# Patient Record
(demographics unavailable — no encounter records)

---

## 2020-08-19 NOTE — PDOC
History of Present Illness





- General


Chief Complaint: Sore Throat


Stated Complaint: SORE THROAT


Time Seen by Provider: 08/19/20 23:25


History Source: Patient


Exam Limitations: No Limitations





- History of Present Illness


Initial Comments: 





08/19/20 23:39


37-year-old female no significant past medical history presenting to the ED with

sore throat for 3 days.  Patient states that her mother had similar symptoms 

which have now resolved.  Patient is still able to tolerate p.o. fluids and food

without difficulty.  Patient was COVID positive back in March.  Pt otherwise 

denies: fevers, chills, syncope, lightheadedness, dizziness, headaches,  neck 

pain, chest pain, shortness of breath, palpitations, back pain, abdominal pain, 

nausea, vomiting, diarrhea, constipation.





Past History





- Medical History


Allergies/Adverse Reactions: 


                                    Allergies











Allergy/AdvReac Type Severity Reaction Status Date / Time


 


No Known Allergies Allergy   Verified 02/10/20 11:26











Home Medications: 


Ambulatory Orders





Ciprofloxacin HCl [Cipro] 500 mg PO BID 7 Days #14 tablet 02/10/20 


Loperamide HCl [Loperamide] 2 mg PO PRN PRN #14 capsule 03/22/20 








COPD: No


CHF: No


DVT: No


Dementia: No





- Surgical History


GI Surgery: Yes (Hernia Repair)





- Reproductive History


Is Patient Pregnant Now?: No





- Immunization History


Immunization Up to Date: Yes





- Psycho-Social/Smoking History


Smoking History: Never smoked


Have you smoked in the past 12 months: No





- Substance Abuse Hx (Audit-C & DAST Scrn)


How often the patient has a drink containing alcohol: Never


Score: In Men: 4 or > Positive; In Women: 3 or > Positive: 0


Screen Result (Pos requires Nsg. Audit-10AR): Negative


In the last yr the pt used illegal drug/Rx for NonMed reason: No


Score:  Yes response is considered Positive: 0


Screen Result (Positive result requires Nsg. DAST-10): Negative





*Physical Exam





- Vital Signs


                                Last Vital Signs











Temp Pulse Resp BP Pulse Ox


 


 98.3 F   113 H  19   132/86   98 


 


 08/19/20 23:13  08/19/20 23:13  08/19/20 23:13  08/19/20 23:13  08/19/20 23:13














- Physical Exam





08/19/20 23:40


Gen: AAOx 3, no acute distress, comfortable, no signs of respiratory distress 


HENT: atraumatic, normocephalic with no laceration or contusion. Nasal mucosa 

without erythema. Oropharynx without erythema or exudates. Mucous membranes 

moist. 


EYES: PERRL, EOM intact, conjunctiva pink 


NECK: supple; trachea midline; no JVD, no lymphadenopathy, or thyromegaly


CV: RRR no murmurs, gallops, or rubs.


CHEST: CTA b/l no wheezing, rales or rhonchi


ABD: +BS/ND. no TTP; soft, no rebound, no guarding


EXTREMITY: no cyanosis or erythema. 2+ dorsalis pedis, posterior tibial, and 

radial pulse. No pedal edema; no calf swelling or tenderness 


SKIN: no rash, warm and dry, no diaphoresis 


HEME: no purpura or ecchymosis


NEURO: normal speech, CN II-XII intact, sensation intact, normal gait, no 

cerebellar deficits


MS: 5/5 strength in all extremities, FROM intact in all extremities.








Medical Decision Making





- Medical Decision Making





08/19/20 23:40


37-year-old female with sore throat


Vital signs significant for mild tachycardia 113





Benign physical exam





Most likely viral pharyngitis





Tachycardia has now resolved in the ED patient's heart rate in the mid 90s





Pt appears well and is safe and stable for discharge with strict return 

precautions including signs and symptoms requring immediate return to the ED





Supportive care instructions explained and given to pt.  Reasons to return 

emergently to ER explained and given. Importance of follow up with PMD and other

specialists as indicated stressed to pt. Pt verbalized understanding of 

instructions.  Pt to follow up with PMD in 2 days.





Discharge





- Discharge Information


Problems reviewed: Yes


Clinical Impression/Diagnosis: 


 Sore throat (viral)





Condition: Stable


Disposition: HOME





- Follow up/Referral


Referrals: 


Isak Gabriel [Primary Care Provider] - 





- Patient Discharge Instructions


Patient Printed Discharge Instructions:  [facility] for Viral Pharyngitis


Additional Instructions: 


RETURN TO THE ER IF YOUR SYMPTOMS WORSEN 





- Post Discharge Activity


Work/Back to School Note:  Back to Work

## 2020-08-19 NOTE — PDOC
*Physical Exam





- Vital Signs


                                Last Vital Signs











Temp Pulse Resp BP Pulse Ox


 


 98.3 F   113 H  19   132/86   98 


 


 08/19/20 23:13  08/19/20 23:13  08/19/20 23:13  08/19/20 23:13  08/19/20 23:13














Medical Decision Making





- Medical Decision Making





08/19/20 23:35


Patient seen by the advanced practice provider under my  supervision. Ancillary 

testing reviewed as necessary.


I agree with plan as outlined by the advanced practice provider.





Discharge





- Discharge Information


Problems reviewed: Yes


Clinical Impression/Diagnosis: 


 Sore throat (viral)





Condition: Stable


Disposition: HOME





- Follow up/Referral


Referrals: 


Isak Gabriel [Primary Care Provider] - 





- Patient Discharge Instructions


Patient Printed Discharge Instructions:  [facility] for Viral Pharyngitis


Additional Instructions: 


RETURN TO THE ER IF YOUR SYMPTOMS WORSEN 





- Post Discharge Activity


Work/Back to School Note:  Back to Work

## 2020-10-13 NOTE — PDOC
History of Present Illness





- General


Chief Complaint: Pain


Stated Complaint: LEG PAIN


Time Seen by Provider: 10/13/20 08:55


History Source: Patient


Exam Limitations: No Limitations





- History of Present Illness


Initial Comments: 





10/13/20 09:06


Patient is a 37-year-old female with a history of prediabetes who presents to 

the ED with complaint of right lower extremity pain, primarily her thigh, for 

the last 4 days.  The patient states that the pain is mostly when walking or 

moving.  She denies any injury.  She denies any falls.  She has been taking 

Aleve which has not been helping.  She states a similar pain started 1 month ago

but resolved after taking Aleve.  The patient denies any birth control, recent 

long travel, or cancer history.  She does state a month ago she drove to [place]

but it took under 5 hours to get there.  The patient denies any shortness of 

breath or chest pain.  She denies palpitations.  She states the pain is deep 

inside and not reproducible.  She denies any allergies to medications.





Past History





- Medical History


Allergies/Adverse Reactions: 


                                    Allergies











Allergy/AdvReac Type Severity Reaction Status Date / Time


 


No Known Allergies Allergy   Verified 10/13/20 08:47











Home Medications: 


Ambulatory Orders





Ciprofloxacin HCl [Cipro] 500 mg PO BID 7 Days #14 tablet 02/10/20 


Loperamide HCl [Loperamide] 2 mg PO PRN PRN #14 capsule 03/22/20 


Cyclobenzaprine HCl [Flexeril 10 mg] 10 mg PO BID PRN #20 tablet 10/13/20 








COPD: No


CHF: No


DVT: No


Dementia: No


Diabetes:  (Pre diabetes)





- Surgical History


GI Surgery: Yes (Hernia Repair)





- Reproductive History


Is Patient Pregnant Now?: No





- Immunization History


Immunization Up to Date: Yes





- Psycho-Social/Smoking History


Smoking History: Never smoked


Have you smoked in the past 12 months: No





- Substance Abuse Hx (Audit-C & DAST Scrn)


How often the patient has a drink containing alcohol: Never


Score: In Men: 4 or > Positive; In Women: 3 or > Positive: 0


Screen Result (Pos requires Nsg. Audit-10AR): Negative


In the last yr the pt used illegal drug/Rx for NonMed reason: No


Score:  Yes response is considered Positive: 0


Screen Result (Positive result requires Nsg. DAST-10): Negative





**Review of Systems





- Review of Systems


Comments:: 





10/13/20 09:07


- Review of Systems


Able to Perform ROS?: Yes


Constitutional: No: Fever, Chills, Loss of Appetite, Night Sweats, Weakness


HEENTM: No: Eye Pain, Vision changes, Ear Pain, Throat Pain, Throat Swelling, 

Mouth Pain, Difficulty Swallowing


Respiratory: No: Cough, Shortness of Breath, Wheezing, Sputum Production


Cardiac (ROS): No: Chest Pain, Chest Tightness, Palpitations, Irregular Heart 

Beat, Edema


ABD/GI: No: Nausea, Vomiting, Abdominal Pain, Diarrhea


: No Dysuria, No Hematuria, No Frequency, No Urgency


Musculoskeletal: No: Muscle Pain, Back Pain, Joint Pain, Muscle Weakness, Neck 

Pain; positive: Right lower extremity pain


Integumentary: No: Lesions, Rash


Neurological: No: Headache, Numbness, Tingling, Weakness, Speech Difficulties











*Physical Exam





- Vital Signs


                                Last Vital Signs











Temp Pulse Resp BP Pulse Ox


 


 97.9 F   82   18   131/83   100 


 


 10/13/20 08:47  10/13/20 08:47  10/13/20 08:47  10/13/20 08:47  10/13/20 08:47














- Physical Exam





10/13/20 09:08


- Physical Exam


General Appearance:  Nourished, Appropriately Dressed, No Distress


HEENT: EOMI, Normal Voice, Hearing Grossly Normal


Neck: Supple, No Lymphadenopathy (R), No Lymphadenopathy (L), No Rigidity, No 

Decreased range of motion


Respiratory/Chest: Lungs Clear, Normal Breath Sounds.  No Respiratory Distress, 

No Accessory Muscle Use; good air entry bilaterally.  No wheezes/rales/rhonchi.


Cardiovascular: Regular Rhythm, Regular Rate, S1, S2


Gastrointestinal/Abdominal: Normal Bowel Sounds, Soft.  Non-tender, No Guarding,

No Rebound, No Rigidity


Musculoskeletal: Normal Inspection.  No Decreased Range of Motion; no 

reproducible right lower extremity pain.  No discrepancy in leg size of the calf

or thigh.  EHL intact.  Negative Altamirano sign.  DP and PT pulses 2+.  Sensation 

intact distally.


Extremity: Normal Capillary Refill, Normal Inspection


Integumentary:  Normal Color, Dry.  No Rash


Neurologic: CNs II-XII NML intact, Fully Oriented, Alert, Normal Mood/Affect, 

Normal Response





ED Treatment Course





- RADIOLOGY


Radiology Studies Ordered: 














 Category Date Time Status


 


 DUPLEX VASCUL US-1 LEG [US] Stat Ultrasound  10/13/20 09:05 Ordered














Medical Decision Making





- Medical Decision Making





10/13/20 09:09


Assessment: Patient is a 37-year-old female with right thigh pain with walking.





Plan:


-Duplex ordered to rule out a DVT of the right lower extremity


-Will reassess





10/13/20 10:28


The patient has been made aware that her ultrasound is negative for DVT.  Her 

symptoms can be secondary to muscle strain.  We will send a prescription for 

Flexeril to the pharmacy for the patient.  She understands and agrees with this 

treatment plan and she has been made aware that she should follow-up with her 

primary doctor within the next few days for repeat evaluation.  She may be 

required to have a repeat ultrasound if her symptoms do not resolve.  The 

patient is stable for discharge.





Discharge





- Discharge Information


Problems reviewed: Yes


Clinical Impression/Diagnosis: 


 Right leg pain





Condition: Stable


Disposition: HOME





- Additional Discharge Information


Prescriptions: 


Cyclobenzaprine HCl [Flexeril 10 mg] 10 mg PO BID PRN #20 tablet


 PRN Reason: Muscle Spasms





- Follow up/Referral


Referrals: 


[facility] [place] [Provider Group]





- Patient Discharge Instructions


Patient Printed Discharge Instructions:  [facility] for Leg Pain


Additional Instructions: 


Get plenty of rest and drink plenty of fluids.  Continue to take Aleve as needed

for pain but be sure to take with food.  A prescription for Flexeril, a muscle 

relaxer, has been sent to the pharmacy for you.  Take this primarily before bed 

to help with pain and muscle spasm.  Be aware that the Flexeril can cause 

drowsiness so do not drive or operate machinery while taking it.  Follow-up with

your primary doctor within 1 to 2 days for repeat evaluation.  If you do not 

have a primary doctor we have referred you to 1.  You may be required to have a 

another ultrasound within 1 week if you continue to have the pain.





- Post Discharge Activity


Work/Back to School Note:  Back to Work

## 2020-10-13 NOTE — XMS
Summarization Of Episode

                           Created on:2020



Patient:ROBLES MORENO

Sex:Female

:1983

External Reference #:[id number]





Demographics







                          Address                   [address]



                                                    [place] [address]

 

                          Home Phone                [phone]

 

                          Mobile Phone              [phone]

 

                          Work Phone                [phone]

 

                          Preferred Language        en

 

                          Marital Status            Not  or 

 

                          Yazdanism Affiliation     [place]

 

                          Race                      [facility]

 

                          Ethnic Group              Not  or 









Author







                          Organization              [facility]









Support







                Name            Relationship    Address         Phone

 

                [employer]   Unavailable     [address] [phone]



                                                [place] [address] 

 

                TANK REESE MOTHER          [address] [phone]



                                                [place] [address] 

 

                TANK REESE Mother          [address] [phone]

[phone]



                                                [place] [address] 









Care Team Providers







                    Name                Role                Phone

 

                    OYEKOLA FNP, MOBOLAJI Unavailable         [phone]

 

                    OYEKOLA FNP, MOBOLAJI Unavailable         [phone]

 

                    OYEKOLA FNP, MOBOLAJI Unavailable         [phone]

 

                    OYEKOLA FNP, MOBOLAJI Unavailable         [phone]

 

                    DanieleVirginia Liz Unavailable         [phone]

 

                    AGYEPONG FNP        Unavailable         [phone]

 

                    AGYEPONG FNP        Unavailable         [phone]

 

                    KAVON FNP        Unavailable         [phone]

 

                    KAVON FNP        Unavailable         [phone]

 

                    KAVON FNP        Unavailable         [phone]

 

                    CHOCO DDS             Unavailable         [phone]









Re-disclosure Warning

The records that you are about to access may contain information from federally-
assisted alcohol or drug abuse programs. If such information is present, then 
the following federally mandated warning applies: This information has been 
disclosed to you from records protected by federal confidentiality rules (42 CFR
part 2). The federal rules prohibit you from making any further disclosure of 
this information unless further disclosure is expressly permitted by the written
consent of the person to whom it pertains or as otherwise permitted by 42 CFR 
part 2. A general authorization for the release of medical or other information 
is NOT sufficient for this purpose. The Federal rules restrict any use of the 
information to criminally investigate or prosecute any alcohol or drug abuse 
patient.The records that you are about to access may contain highly sensitive 
health information, the redisclosure of which is protected by Article 27-F of 
the [employer] Public [employer] law. If you continue you may haveaccess to 
information: Regarding HIV / AIDS; Provided by facilities licensed or operated 
by the [employer] Office of Mental Health; or Provided by the [employer]
Office for People With Developmental Disabilities. If such information is 
present, then the following [place] mandated warning applies: This 
information has been disclosed to you from confidential records which are 
protected by state law. State law prohibits you from making any further 
disclosure of this information without the specific written consent of the 
person to whom it pertains, or as otherwise permitted by law. Any unauthorized 
further disclosure in violation of state law may result in a fine or penitentiary 
sentence or both. A general authorization for the release of medical or other 
information is NOT sufficient authorization for further disclosure.



Allergies and Adverse Reactions







           Type       Description Substance  Reaction   Status     Data Source(s

)

 

           Allergy to No Known Allergies No known                         [facility]

AY ([facility]



           substance             allergies                        Formerly named [facility]

)

 

           Allergy to No Known Allergies No known                         [facility]EN[facility]

AY ([facility]



           substance             allergies                        Formerly named [facility]

)

 

           Allergy to No Known Allergies No known                         GREENW

AY ([facility]



           substance             allergies                        Formerly named [facility]

)

 

           Allergy to No Known Allergies No known                         [facility]EN[facility]

AY ([facility]



           substance             allergies                        Formerly named [facility]

)

 

           Allergy to No Known Allergies No known                         GREENW

AY ([facility]



           substance             allergies                        Formerly named [facility]

)

 

           Allergy to No Known Allergies No known                         [facility]EN[facility]

AY ([facility]



           substance             allergies                        Formerly named [facility]

)







Encounters







           Encounter  Providers  Location   Date       Indications Data



                                                                  Source(s)

 

           Outpatient< Attender:  Ceferino    /     ThrombocytosisPrediabete

sObesity ISAIAH



           td         DARREN      Community  2020       [facility]     ([place]



           ID="[facility]     02:30:                Neighborhood



           erTypeDescr FNP        Center     00 PM                 Health



           iptionID0">                       EDT -                 Center)



           OFFICE                           /                



           VISIT</td><                                         



           td>DARREN                         04:40:                



           [facility]                         37 PM                 



           FNP</td><td                       EDT                   



           >[facility]</td>                                             



           <td>                                             



           020</td><td                                             



           ><content                                              



           ID="encount                                             



           erDiagnosis                                             



           ID0-0">Pred                                             



           iabetes</co                                             



           ntent>,                                                



           <content                                               



           ID="encount                                             



           erDiagnosis                                             



           ID0-1">Thro                                             



           mbocytosis<                                             



           /content>,                                             



           <content                                               



           ID="encount                                             



           erDiagnosis                                             



           ID0-2">Obes                                             



           ity                                                    



           Morbid</con                                             



           tent></td>                                             









                                        Thrombocytosis

 

                                        Prediabetes

 

                                        Obesity [facility]









           Outpatient<td Attender:  Ceferino    2020            [place]



                ID="encounterTypeDescriptionID1">*OUTREACH*</td><td>[facility]       

12:31:00 PM                                         ([facility] FNP</td><td>[facility]     E

DT -                 

Neighborhood



           Center</td><td>2020</td><td></td> [facility]        Center     

020            Health



                                            11:59:00 PM            Center)



                                            EDT                   

 

                Outpatient<td ID="encounterTypeDescriptionID2">COMPLETE Attender

:       Ceferino         

07/15/2020                P                         [place]



                PHYSICAL EXAM</td><td>[facility]</td><td>[facility]       

10:00:00 AM               r                         ([facility]     EDT -      e          Neighbor

[place]



           Center</td><td>07/15/2020</td><td><content FNP        Center      v          Health



           ID="encounterDiagnosisID2-0">Routine Pre-employment                  

     12:20:45 PM e          Center)



           Screening Examination</content>, <content                       EDT  

      n          



           ID="encounterDiagnosisID2-1">Obesity [facility]</content>,               

                   t          



           <content ID="encounterDiagnosisID2-2">Questionnaires Phq-9           

                       i          



           Quick Depression Assessment Panel</content>, <content                

                  v          



           ID="encounterDiagnosisID2-3">Preventive Med Standardized             

                     e          



           Depression Screening: Positive For Symptoms</content></td>           

                       M          



                                                       e          



                                                       d          



                                                       S          



                                                       t          



                                                       a          



                                                       n          



                                                       d          



                                                       a          



                                                       r          



                                                       d          



                                                       i          



                                                       z          



                                                       e          



                                                       d          



                                                       D          



                                                       e          



                                                       p          



                                                       r          



                                                       e          



                                                       s          



                                                       s          



                                                       i          



                                                       o          



                                                       n          



                                                       S          



                                                       c          



                                                       r          



                                                       e          



                                                       e          



                                                       n          



                                                       i          



                                                       n          



                                                       g          



                                                       :          



                                                       P          



                                                       o          



                                                       s          



                                                       i          



                                                       t          



                                                       i          



                                                       v          



                                                       e          



                                                       F          



                                                       o          



                                                       r          



                                                       S          



                                                       y          



                                                       m          



                                                       p          



                                                       t          



                                                       o          



                                                       m          



                                                       s          



                                                       Q          



                                                       u          



                                                       e          



                                                       s          



                                                       t          



                                                       i          



                                                       o          



                                                       n          



                                                       n          



                                                       a          



                                                       i          



                                                       r          



                                                       e          



                                                       s          



                                                       P          



                                                       h          



                                                       q          



                                                       -          



                                                       9          



                                                       Q          



                                                       u          



                                                       i          



                                                       c          



                                                       k          



                                                       D          



                                                       e          



                                                       p          



                                                       r          



                                                       e          



                                                       s          



                                                       s          



                                                       i          



                                                       o          



                                                       n          



                                                       A          



                                                       s          



                                                       s          



                                                       e          



                                                       s          



                                                       s          



                                                       m          



                                                       e          



                                                       n          



                                                       t          



                                                       P          



                                                       a          



                                                       n          



                                                       e          



                                                       l          



                                                       R          



                                                       o          



                                                       u          



                                                       t          



                                                       i          



                                                       n          



                                                       e          



                                                       P          



                                                       r          



                                                       e          



                                                       -          



                                                       e          



                                                       m          



                                                       p          



                                                       l          



                                                       o          



                                                       y          



                                                       m          



                                                       e          



                                                       n          



                                                       t          



                                                       S          



                                                       c          



                                                       r          



                                                       e          



                                                       e          



                                                       n          



                                                       i          



                                                       n          



                                                       g          



                                                       E          



                                                       x          



                                                       a          



                                                       m          



                                                       i          



                                                       n          



                                                       a          



                                                       t          



                                                       i          



                                                       o          



                                                       n          



                                                       P          



                                                       r          



                                                       e          



                                                       v          



                                                       e          



                                                       n          



                                                       t          



                                                       i          



                                                       v          



                                                       e          



                                                       M          



                                                       e          



                                                       d          



                                                       S          



                                                       t          



                                                       a          



                                                       n          



                                                       d          



                                                       a          



                                                       r          



                                                       d          



                                                       i          



                                                       z          



                                                       e          



                                                       d          



                                                       D          



                                                       e          



                                                       p          



                                                       r          



                                                       e          



                                                       s          



                                                       s          



                                                       i          



                                                       o          



                                                       n          



                                                       S          



                                                       c          



                                                       r          



                                                       e          



                                                       e          



                                                       n          



                                                       i          



                                                       n          



                                                       g          



                                                       :          



                                                       P          



                                                       o          



                                                       s          



                                                       i          



                                                       t          



                                                       i          



                                                       v          



                                                       e          



                                                       F          



                                                       o          



                                                       r          



                                                       S          



                                                       y          



                                                       m          



                                                       p          



                                                       t          



                                                       o          



                                                       m          



                                                       s          



                                                       Q          



                                                       u          



                                                       e          



                                                       s          



                                                       t          



                                                       i          



                                                       o          



                                                       n          



                                                       n          



                                                       a          



                                                       i          



                                                       r          



                                                       e          



                                                       s          



                                                       P          



                                                       h          



                                                       q          



                                                       -          



                                                       9          



                                                       Q          



                                                       u          



                                                       i          



                                                       c          



                                                       k          



                                                       D          



                                                       e          



                                                       p          



                                                       r          



                                                       e          



                                                       s          



                                                       s          



                                                       i          



                                                       o          



                                                       n          



                                                       A          



                                                       s          



                                                       s          



                                                       e          



                                                       s          



                                                       s          



                                                       m          



                                                       e          



                                                       n          



                                                       t          



                                                       P          



                                                       a          



                                                       n          



                                                       e          



                                                       l          



                                                       R          



                                                       o          



                                                       u          



                                                       t          



                                                       i          



                                                       n          



                                                       e          



                                                       P          



                                                       r          



                                                       e          



                                                       -          



                                                       e          



                                                       m          



                                                       p          



                                                       l          



                                                       o          



                                                       y          



                                                       m          



                                                       e          



                                                       n          



                                                       t          



                                                       S          



                                                       c          



                                                       r          



                                                       e          



                                                       e          



                                                       n          



                                                       i          



                                                       n          



                                                       g          



                                                       E          



                                                       x          



                                                       a          



                                                       m          



                                                       i          



                                                       n          



                                                       a          



                                                       t          



                                                       i          



                                                       o          



                                                       n          



                                                       P          



                                                       r          



                                                       e          



                                                       v          



                                                       e          



                                                       n          



                                                       t          



                                                       i          



                                                       v          



                                                       e          



                                                       M          



                                                       e          



                                                       d          



                                                       S          



                                                       t          



                                                       a          



                                                       n          



                                                       d          



                                                       a          



                                                       r          



                                                       d          



                                                       i          



                                                       z          



                                                       e          



                                                       d          



                                                       D          



                                                       e          



                                                       p          



                                                       r          



                                                       e          



                                                       s          



                                                       s          



                                                       i          



                                                       o          



                                                       n          



                                                       S          



                                                       c          



                                                       r          



                                                       e          



                                                       e          



                                                       n          



                                                       i          



                                                       n          



                                                       g          



                                                       :          



                                                       P          



                                                       o          



                                                       s          



                                                       i          



                                                       t          



                                                       i          



                                                       v          



                                                       e          



                                                       F          



                                                       o          



                                                       r          



                                                       S          



                                                       y          



                                                       m          



                                                       p          



                                                       t          



                                                       o          



                                                       m          



                                                       s          



                                                       Q          



                                                       u          



                                                       e          



                                                       s          



                                                       t          



                                                       i          



                                                       o          



                                                       n          



                                                       n          



                                                       a          



                                                       i          



                                                       r          



                                                       e          



                                                       s          



                                                       P          



                                                       h          



                                                       q          



                                                       -          



                                                       9          



                                                       Q          



                                                       u          



                                                       i          



                                                       c          



                                                       k          



                                                       D          



                                                       e          



                                                       p          



                                                       r          



                                                       e          



                                                       s          



                                                       s          



                                                       i          



                                                       o          



                                                       n          



                                                       A          



                                                       s          



                                                       s          



                                                       e          



                                                       s          



                                                       s          



                                                       m          



                                                       e          



                                                       n          



                                                       t          



                                                       P          



                                                       a          



                                                       n          



                                                       e          



                                                       l          



                                                       R          



                                                       o          



                                                       u          



                                                       t          



                                                       i          



                                                       n          



                                                       e          



                                                       P          



                                                       r          



                                                       e          



                                                       -          



                                                       e          



                                                       m          



                                                       p          



                                                       l          



                                                       o          



                                                       y          



                                                       m          



                                                       e          



                                                       n          



                                                       t          



                                                       S          



                                                       c          



                                                       r          



                                                       e          



                                                       e          



                                                       n          



                                                       i          



                                                       n          



                                                       g          



                                                       E          



                                                       x          



                                                       a          



                                                       m          



                                                       i          



                                                       n          



                                                       a          



                                                       t          



                                                       i          



                                                       o          



                                                       n          



                                                       P          



                                                       r          



                                                       e          



                                                       v          



                                                       e          



                                                       n          



                                                       t          



                                                       i          



                                                       v          



                                                       e          



                                                       M          



                                                       e          



                                                       d          



                                                       S          



                                                       t          



                                                       a          



                                                       n          



                                                       d          



                                                       a          



                                                       r          



                                                       d          



                                                       i          



                                                       z          



                                                       e          



                                                       d          



                                                       D          



                                                       e          



                                                       p          



                                                       r          



                                                       e          



                                                       s          



                                                       s          



                                                       i          



                                                       o          



                                                       n          



                                                       S          



                                                       c          



                                                       r          



                                                       e          



                                                       e          



                                                       n          



                                                       i          



                                                       n          



                                                       g          



                                                       :          



                                                       P          



                                                       o          



                                                       s          



                                                       i          



                                                       t          



                                                       i          



                                                       v          



                                                       e          



                                                       F          



                                                       o          



                                                       r          



                                                       S          



                                                       y          



                                                       m          



                                                       p          



                                                       t          



                                                       o          



                                                       m          



                                                       s          



                                                       Q          



                                                       u          



                                                       e          



                                                       s          



                                                       t          



                                                       i          



                                                       o          



                                                       n          



                                                       n          



                                                       a          



                                                       i          



                                                       r          



                                                       e          



                                                       s          



                                                       P          



                                                       h          



                                                       q          



                                                       -          



                                                       9          



                                                       Q          



                                                       u          



                                                       i          



                                                       c          



                                                       k          



                                                       D          



                                                       e          



                                                       p          



                                                       r          



                                                       e          



                                                       s          



                                                       s          



                                                       i          



                                                       o          



                                                       n          



                                                       A          



                                                       s          



                                                       s          



                                                       e          



                                                       s          



                                                       s          



                                                       m          



                                                       e          



                                                       n          



                                                       t          



                                                       P          



                                                       a          



                                                       n          



                                                       e          



                                                       l          



                                                       R          



                                                       o          



                                                       u          



                                                       t          



                                                       i          



                                                       n          



                                                       e          



                                                       P          



                                                       r          



                                                       e          



                                                       -          



                                                       e          



                                                       m          



                                                       p          



                                                       l          



                                                       o          



                                                       y          



                                                       m          



                                                       e          



                                                       n          



                                                       t          



                                                       S          



                                                       c          



                                                       r          



                                                       e          



                                                       e          



                                                       n          



                                                       i          



                                                       n          



                                                       g          



                                                       E          



                                                       x          



                                                       a          



                                                       m          



                                                       i          



                                                       n          



                                                       a          



                                                       t          



                                                       i          



                                                       o          



                                                       n          



                                                       O          



                                                       b          



                                                       e          



                                                       s          



                                                       i          



                                                       t          



                                                       y          



                                                       M          



                                                       o          



                                                       r          



                                                       b          



                                                       i          



                                                       d          



                                                       O          



                                                       b          



                                                       e          



                                                       s          



                                                       i          



                                                       t          



                                                       y          



                                                       M          



                                                       o          



                                                       r          



                                                       b          



                                                       i          



                                                       d          



                                                       O          



                                                       b          



                                                       e          



                                                       s          



                                                       i          



                                                       t          



                                                       y          



                                                       M          



                                                       o          



                                                       r          



                                                       b          



                                                       i          



                                                       d          



                                                       O          



                                                       b          



                                                       e          



                                                       s          



                                                       i          



                                                       t          



                                                       y          



                                                       M          



                                                       o          



                                                       r          



                                                       b          



                                                       i          



                                                       d          









                                        Preventive Med Standardized Depression S

creening: Positive For Symptoms

 

                                        Questionnaires Phq-9 Quick Depression As

sessment Panel

 

                                        Routine Pre-employment Screening [employer]

tion

 

                                        Preventive Med Standardized Depression S

creening: Positive For Symptoms

 

                                        Questionnaires Phq-9 Quick Depression As

sessment Panel

 

                                        Routine Pre-employment Screening [employer]

tion

 

                                        Preventive Med Standardized Depression S

creening: Positive For Symptoms

 

                                        Questionnaires Phq-9 Quick Depression As

sessment Panel

 

                                        Routine Pre-employment Screening [employer]

tion

 

                                        Preventive Med Standardized Depression S

creening: Positive For Symptoms

 

                                        Questionnaires Phq-9 Quick Depression As

sessment Panel

 

                                        Routine Pre-employment Screening [employer]

tion

 

                                        Obesity Morbid

 

                                        Obesity Morbid

 

                                        Obesity Morbid

 

                                        Obesity Morbid









             Outpatient<td Attender:    Ceferino      2020   

ThrombocytosisAnemiaThrombocytosisAnemiaThrombocytosisAnemiaThrombocytosisAnemia

ThrombocytosisAnemiaObesity             [place]



                ID="encounterTypeDescriptionID3">[facility]</td><td>[facility]       

03:15:00 PM               MorbidObesity MorbidObesity MorbidObesity MorbidObesit

y Morbid 

([facility]</td><td>[facility]

[facility]



           Center</td><td>2020</td><td><content FN        Center                 Health



           ID="encounterDiagnosisID3-0">Obesity Morbid</content>,               

        03:34:18 PM            Center)



           <content ID="encounterDiagnosisID3-1">Anemia</content>,              

         EST                   



           <content                                               



           ID="encounterDiagnosisID3-2">Thrombocytosis</content></td>           

                                  









                                        Thrombocytosis

 

                                        Anemia

 

                                        Thrombocytosis

 

                                        Anemia

 

                                        Thrombocytosis

 

                                        Anemia

 

                                        Thrombocytosis

 

                                        Anemia

 

                                        Thrombocytosis

 

                                        Anemia

 

                                        Obesity Morbid

 

                                        Obesity Morbid

 

                                        Obesity Morbid

 

                                        Obesity Morbid

 

                                        Obesity Morbid









           Outpatient<td Attender:  Ceferino    2020 Routine    [facility]



             ID="encounterTypeDescriptionID4">OFFICE [facility]    0

9:00:00 AM  

Pre-employment                          ([employer]



           VISIT</td><td>[facility]



           FN</td><td>[facility]        Center     2020

 [facility]



           Center</td><td>2020</td><td><content                       11:5

0:06 AM Pre-employment Center)



           ID="encounterDiagnosisID4-0">Obesity                       EST       

 Screening  



           Morbid</content>, <content                                  Examinati

onRoutine 



           ID="encounterDiagnosisID4-1">Routine                                 

 Pre-employment 



           Pre-employment Screening                                  Screening  



           Examination</content></td>                                  Examinati

onRoutine 



                                                       Pre-employment 



                                                       Screening  



                                                       ExaminationRoutine 



                                                       Pre-employment 



                                                       Screening  



                                                       ExaminationRoutine 



                                                       Pre-employment 



                                                       Screening  



                                                       ExaminationObesity 



                                                       MorbidObesity 



                                                       MorbidObesity 



                                                       MorbidObesity 



                                                       MorbidObesity 



                                                       MorbidObesity 



                                                       Morbid     









                                        Routine Pre-employment Screening [employer]

tion

 

                                        Routine Pre-employment Screening [employer]

tion

 

                                        Routine Pre-employment Screening [employer]

tion

 

                                        Routine Pre-employment Screening [employer]

tion

 

                                        Routine Pre-employment Screening [employer]

tion

 

                                        Routine Pre-employment Screening [employer]

tion

 

                                        Obesity Morbid

 

                                        Obesity Morbid

 

                                        Obesity Morbid

 

                                        Obesity Morbid

 

                                        Obesity Morbid

 

                                        Obesity Morbid









           Outpatient<td Attender:  Ceferino    2020            [facility]



           ID="encounterTypeDescriptionID5">*No [facility]  01:36:00 P

M            (Jazz Monreal



           Show*</td><td>[facility]



           FN</td><td>[facility]        Center     2020

            Health



           Center</td><td>2020</td><td></td>                       11:59:0

0 PM            Center)



                                            EST                   

 

           Outpatient<td Attender:  Ceferino    2020 R          [facility]



           ID="encounterTypeDescriptionID6">LUZ AUGUSTIN      [facility]  11:30:

00 AM o          (Jazz Monreal



           </td><td>[facility]     EST -      u          N

[facility]</td><td>[facility] FN        Center     2020

 t          Health



           Center</td><td>2020</td><td><abelino                       12:26:

48 PM i          Center)



           nt ID="encounterDiagnosisID6-0">Routine                       EST    

    n          



           Pre-employment Screening                                  e          



           Examination</content>, <content                                  P   

       



           ID="encounterDiagnosisID6-1">Obesity                                 

 r          



           Morbid</content></td>                                  e          



                                                       -          



                                                       e          



                                                       m          



                                                       p          



                                                       l          



                                                       o          



                                                       y          



                                                       m          



                                                       e          



                                                       n          



                                                       t          



                                                       S          



                                                       c          



                                                       r          



                                                       e          



                                                       e          



                                                       n          



                                                       i          



                                                       n          



                                                       g          



                                                       E          



                                                       x          



                                                       a          



                                                       m          



                                                       i          



                                                       n          



                                                       a          



                                                       t          



                                                       i          



                                                       o          



                                                       n          



                                                       R          



                                                       o          



                                                       u          



                                                       t          



                                                       i          



                                                       n          



                                                       e          



                                                       P          



                                                       r          



                                                       e          



                                                       -          



                                                       e          



                                                       m          



                                                       p          



                                                       l          



                                                       o          



                                                       y          



                                                       m          



                                                       e          



                                                       n          



                                                       t          



                                                       S          



                                                       c          



                                                       r          



                                                       e          



                                                       e          



                                                       n          



                                                       i          



                                                       n          



                                                       g          



                                                       E          



                                                       x          



                                                       a          



                                                       m          



                                                       i          



                                                       n          



                                                       a          



                                                       t          



                                                       i          



                                                       o          



                                                       n          



                                                       R          



                                                       o          



                                                       u          



                                                       t          



                                                       i          



                                                       n          



                                                       e          



                                                       P          



                                                       r          



                                                       e          



                                                       -          



                                                       e          



                                                       m          



                                                       p          



                                                       l          



                                                       o          



                                                       y          



                                                       m          



                                                       e          



                                                       n          



                                                       t          



                                                       S          



                                                       c          



                                                       r          



                                                       e          



                                                       e          



                                                       n          



                                                       i          



                                                       n          



                                                       g          



                                                       E          



                                                       x          



                                                       a          



                                                       m          



                                                       i          



                                                       n          



                                                       a          



                                                       t          



                                                       i          



                                                       o          



                                                       n          



                                                       R          



                                                       o          



                                                       u          



                                                       t          



                                                       i          



                                                       n          



                                                       e          



                                                       P          



                                                       r          



                                                       e          



                                                       -          



                                                       e          



                                                       m          



                                                       p          



                                                       l          



                                                       o          



                                                       y          



                                                       m          



                                                       e          



                                                       n          



                                                       t          



                                                       S          



                                                       c          



                                                       r          



                                                       e          



                                                       e          



                                                       n          



                                                       i          



                                                       n          



                                                       g          



                                                       E          



                                                       x          



                                                       a          



                                                       m          



                                                       i          



                                                       n          



                                                       a          



                                                       t          



                                                       i          



                                                       o          



                                                       n          



                                                       R          



                                                       o          



                                                       u          



                                                       t          



                                                       i          



                                                       n          



                                                       e          



                                                       P          



                                                       r          



                                                       e          



                                                       -          



                                                       e          



                                                       m          



                                                       p          



                                                       l          



                                                       o          



                                                       y          



                                                       m          



                                                       e          



                                                       n          



                                                       t          



                                                       S          



                                                       c          



                                                       r          



                                                       e          



                                                       e          



                                                       n          



                                                       i          



                                                       n          



                                                       g          



                                                       E          



                                                       x          



                                                       a          



                                                       m          



                                                       i          



                                                       n          



                                                       a          



                                                       t          



                                                       i          



                                                       o          



                                                       n          



                                                       R          



                                                       o          



                                                       u          



                                                       t          



                                                       i          



                                                       n          



                                                       e          



                                                       P          



                                                       r          



                                                       e          



                                                       -          



                                                       e          



                                                       m          



                                                       p          



                                                       l          



                                                       o          



                                                       y          



                                                       m          



                                                       e          



                                                       n          



                                                       t          



                                                       S          



                                                       c          



                                                       r          



                                                       e          



                                                       e          



                                                       n          



                                                       i          



                                                       n          



                                                       g          



                                                       E          



                                                       x          



                                                       a          



                                                       m          



                                                       i          



                                                       n          



                                                       a          



                                                       t          



                                                       i          



                                                       o          



                                                       n          



                                                       R          



                                                       o          



                                                       u          



                                                       t          



                                                       i          



                                                       n          



                                                       e          



                                                       P          



                                                       r          



                                                       e          



                                                       -          



                                                       e          



                                                       m          



                                                       p          



                                                       l          



                                                       o          



                                                       y          



                                                       m          



                                                       e          



                                                       n          



                                                       t          



                                                       S          



                                                       c          



                                                       r          



                                                       e          



                                                       e          



                                                       n          



                                                       i          



                                                       n          



                                                       g          



                                                       E          



                                                       x          



                                                       a          



                                                       m          



                                                       i          



                                                       n          



                                                       a          



                                                       t          



                                                       i          



                                                       o          



                                                       n          



                                                       O          



                                                       b          



                                                       e          



                                                       s          



                                                       i          



                                                       t          



                                                       y          



                                                       M          



                                                       o          



                                                       r          



                                                       b          



                                                       i          



                                                       d          



                                                       O          



                                                       b          



                                                       e          



                                                       s          



                                                       i          



                                                       t          



                                                       y          



                                                       M          



                                                       o          



                                                       r          



                                                       b          



                                                       i          



                                                       d          



                                                       O          



                                                       b          



                                                       e          



                                                       s          



                                                       i          



                                                       t          



                                                       y          



                                                       M          



                                                       o          



                                                       r          



                                                       b          



                                                       i          



                                                       d          



                                                       O          



                                                       b          



                                                       e          



                                                       s          



                                                       i          



                                                       t          



                                                       y          



                                                       M          



                                                       o          



                                                       r          



                                                       b          



                                                       i          



                                                       d          



                                                       O          



                                                       b          



                                                       e          



                                                       s          



                                                       i          



                                                       t          



                                                       y          



                                                       M          



                                                       o          



                                                       r          



                                                       b          



                                                       i          



                                                       d          



                                                       O          



                                                       b          



                                                       e          



                                                       s          



                                                       i          



                                                       t          



                                                       y          



                                                       M          



                                                       o          



                                                       r          



                                                       b          



                                                       i          



                                                       d          



                                                       O          



                                                       b          



                                                       e          



                                                       s          



                                                       i          



                                                       t          



                                                       y          



                                                       M          



                                                       o          



                                                       r          



                                                       b          



                                                       i          



                                                       d          









                                        Routine Pre-employment Screening [employer]

tion

 

                                        Routine Pre-employment Screening [employer]

tion

 

                                        Routine Pre-employment Screening [employer]

tion

 

                                        Routine Pre-employment Screening [employer]

tion

 

                                        Routine Pre-employment Screening [employer]

tion

 

                                        Routine Pre-employment Screening [employer]

tion

 

                                        Routine Pre-employment Screening [employer]

tion

 

                                        Obesity Morbid

 

                                        Obesity Morbid

 

                                        Obesity Morbid

 

                                        Obesity Morbid

 

                                        Obesity Morbid

 

                                        Obesity Morbid

 

                                        Obesity Morbid









           Outpatient<td Attender:  Ceferino    2019            [facility]



           ID="en[id number]">DENTAL [facility]  11:00:0

0 AM            ([facility] 

Rah



           RE[facility]CALL VISIT</td><[facility]       

          [facility]</td><td>[facility]     2019

            Health



           Center</td><td>2019</td><td></td>                       11:59:0

0 PM            Center)



                                            EDT                   

 

           Outpatient<td Attender:  Ceferino    2019            [facility]



           [id number][facility]  [facility]

00 AM            ([facility] 

Rah



           ISIT</td><td>[facility]     EDT [facility]</td><td>[facility]     

            Health



           Health                           11:59:00 PM            Center)



           Center</td><td>2019</td><td></td>                       EDT    

               







Medications







       Medication Brand  Start  Product Dose   Route  Administrative Pharmacy St

atus 

Indications         Reaction            Description         Data



          Name Date Form           Instructions Instructions                    

 Source(s)

 

     Tricor 48MG Tricor / UNIT 1                   active           Tricor 

ISAIAH



     Oral Tablet 48MG                                               ([facility]



          Oral 12:00:                                              [facility]



          Tablet 00 AM                                              [facility]



               EDT                                                Health



                                                                 Center)

 

     Vitamin D Vitami / UNIT                     active           Vitamin D

 ISAIAH



     (Ergocalcif n D                                           (Ergocalcife 

([facility]



     niraj) 1.25 (Ergoc 12:00:                                         rol) Verno

n



     MG(31360 alcife 00 AM                                              [facility]

ho



     UT) Oral rol) EDT                                               [facility]



     Capsule 1.25                                                   Center)



          MG(500                                                   



          00 UT)                                                   



          Oral                                                   



          Capsul                                                   



          e                                                      

 

     ferrous Ferrou / UNIT 1                   complet           Ferrous GR

EENWAY



     sulfate 325 s    2019                          ed             Sulfate (M[facility]

t



     MG Oral Sulfat 12:00:                                              Rah



     Tablet e 325 00 AM                                              [facility]



     Ferrous (65  EDT                                               [facility]



     Sulfate 325 Fe)MG                                                   Center)



     (65 Fe)MG Oral                                                   



     Oral Tablet Tablet                                                   

 

     Vitamin D Vitami / UNIT                     suspend           Vitamin 

D ISAIAH



     (Ergocalcif n D  2019                          ed             (Ergocalcife 

([facility]



     niraj) (Ergoc 12:00:                                         rol) Rah



     [id number] alcife 00 AM                                              [facility]

rho



     Oral rol) EDT                                               [facility]



     Capsule [id number]                                                   Center)



          NIT                                                    



          Oral                                                   



          Capsul                                                   



          e                                                      

 

     Ascorbic Ascorb / UNIT 1                   complet           Ascorbic 

ISAIAH



     Acid 500 MG ic   2019                          ed             Acid ([facility]



     Oral Tablet Acid 12:00:                                              Rha



     Ascorbic 500MG 00 AM                                              [facility]

o



     Acid 500MG Oral EDT                                               [facility]



     Oral Tablet Tablet                                                   Center

)

 

     Tricor 48MG Tricor / UNIT 1                   suspend           Tricor

 ISAIAH



     Oral Tablet 48MG 2018                          ed                  ([facility]



          Oral 12:00:                                              [facility]



          Tablet 00 AM                                              [facility]



               EDT                                               [facility]



                                                                 Center)







Insurance Providers







          Payer name Policy type Policy ID Covered   Covered   Policy    Plan



                    / Coverage           party ID  party's   [employer]

on



                    type                          relationship           



                                                  to mitchell           

 

          [facility] MEDICAID COMM           [id number]           SP                  1

[id number]



          PLAN                                                        

 

          [place] Individual 0                   Self                0



          Employees ([place]) Policy                                            



          - [facility] Individual 0                   Self                0



          Employees ([employer] [place]) Policy                                            



          - [facility] Individual 0                   Self                0



          Employees ([employer]) Policy                                            



          - [facility] Individual 0                   Self                0



          Employees ([employer] [place]) Policy                                            



          - [facility] Individual 0                   Self                0



          Employees ([employer] [place]) Policy                                            



          - [facility] Individual 0                   Self                0



          Employees ([employer] [place]) Policy                                            



          - [facility] Individual 0                   Self                0



          Employees ([place]) Policy                                            



          - [facility] MEDICAID COMM           [id number]           SP                  0

[id number]



          PLAN                                                        

 

          [place] Individual 0                   Self                0



          Employees ([place]) Policy                                            



          - [facility] Individual 0                   Self                0



          Employees ([place]) Policy                                            



          - [facility] Individual 0                   Self                0



          Employees ([place]) Policy                                            



          - [facility] Individual 0                   Self                0



          Employees ([place]) Policy                                            



          - [facility] Individual 0                   Self                0



          Employees ([place]) Policy                                            



          - [facility] Individual 0                   Self                0



          Employees ([place]) Policy                                            



          - [facility]                                                   

 

          [facility]           [id number]           S                   

[id number]



          MKD                                                         

 

          [employer]           [id number]           S                   [id number]

[id number]



          MKD                                                         

 

          [facility]           [id number]           S                   [id number]

[id number]



          Options MKD                                                   

 

          MVP Medicaid           [id number]           S                   [id number]

[id number]



          Managed Care                                                   

 

          Value Options MKD           [id number]           S                   [id number]

[id number]



          (Behaviorial                                                   



          Managemanagent)                                                   

 

          Dental [facility]           [id number]           S                   [id number]



          Community  MKD                                                   



          Plan Dental                                                   

 

          Medicaid 4013           [id number]            S                   [id number]

[id number]



          Regular Clinic                                                   



          Visit                                                       

 

          [employer]           [id number]           S                   [id number]

[id number]



          MKD Community Plan                                                   

 

          [place] 0                   Self                0



          [place]                                            

 

          [place] 0                   [place]                0



          [place]      Policy                                            







Problems, Conditions, and Diagnoses







           Code       Display Name Description Problem Type Effective  Data Sour

ce(s)



                                                       Dates      

 

           [id number]    Thrombocytosis Thrombocytosis Problem    2020 [facility]

[place] ([place]



                      (disorder)                       12:00:00 AM [facility])

 

           [id number]  Leukocytosis Leukocytosis Problem    2020 [place] (

[place]



                      (disorder)                       12:00:00 AM [facility])

 

           [id number]  Anemia (disorder) Anemia     Problem    2020 [facility]

[place] ([place]



                                                       12:00:00 AM [facility])

 

           [id number]    Thrombocytosis Thrombocytosis Problem    2020 [facility]

[place] ([place]



                      (disorder)                       12:00:00 AM [facility])

 

           [id number]  Leukocytosis Leukocytosis Problem    2020 [place] (

[place]



                      (disorder)                       12:00:00 AM [facility])

 

           [id number]  Anemia (disorder) Anemia     Problem    2020 [facility]

[place] ([place]



                                                       12:00:00 AM [facility])

 

           [id number]    Thrombocytosis Thrombocytosis Problem    2020 [facility]

[place] ([place]



                      (disorder)                       12:00:00 AM [facility])

 

           [id number]  Leukocytosis Leukocytosis Problem    2020 [place] (

[place]



                      (disorder)                       12:00:00 AM [facility])

 

           [id number]  Anemia (disorder) Anemia     Problem    2020 [facility]

[place] ([place]



                                                       12:00:00 AM [facility])

 

           [id number]    Thrombocytosis Thrombocytosis Problem    07/15/2020 [facility]

[place] ([place]



                      (disorder)                       12:00:00 AM [facility])

 

           [id number]  Leukocytosis Leukocytosis Problem    07/15/2020 [place] (

[place]



                      (disorder)                       12:00:00 AM [facility])

 

           [id number]  Anemia (disorder) Anemia     Problem    07/15/2020 [place]

[place] ([place]



                                                       12:00:00 AM [facility])

 

           [id number]    Thrombocytosis Thrombocytosis Problem    2020 [place]

[place] ([place]



                      (disorder)                       12:00:00 AM [facility])

 

           [id number]  Leukocytosis Leukocytosis Problem    2020 [place] (

[place]



                      (disorder)                       12:00:00 AM [facility])

 

           [id number]  Anemia (disorder) Anemia     Problem    2020 [place]

[place] ([place]



                                                       12:00:00 AM [facility])

 

           [id number]    Thrombocytosis Thrombocytosis Problem    2020 [place]

[place] ([place]



                      (disorder)                       12:00:00 AM [facility])

 

           [id number]  Leukocytosis Leukocytosis Problem    2020 [place] (

[place]



                      (disorder)                       12:00:00 AM [facility])

 

           [id number]  Anemia (disorder) Anemia     Problem    2020 [place]

[place] ([place]



                                                       12:00:00 AM [facility])

 

           [id number]    Thrombocytosis Thrombocytosis Problem    2020 [facility]

[place] ([place]



                      (disorder)                       12:00:00 AM [facility])

 

           [id number]  Leukocytosis Leukocytosis Problem    2020 [place] (

[place]



                      (disorder)                       12:00:00 AM [facility])

 

           [id number]  Anemia (disorder) Anemia     Problem    2020 [place]

[place] ([place]



                                                       12:00:00 AM [facility])







Surgeries/Procedures







             Procedure    Description  Date         Indications  Data Source(s)

 

             HEMOGLOBIN  A1C HEMOGLOBIN  A1C 2020                [place] 

([place]



                                       12:00:00 AM               [facility])

 

             LIPID PANEL  LIPID PANEL  2020                [place] ([facility]



                                       12:00:00 AM               [facility])

 

             TSH-THYROID  TSH-THYROID  2020                [place] ([facility]



             STIMULATING  STIMULATING  12:00:00 AM               [facility])

 

             VITAMIN  D 25 - VITAMIN  D 25 - 2020                [place] 

([facility]



             HYDROXY      HYDROXY      12:00:00 AM               [facility])

 

             QUANTIFERON  QUANTIFERON  2020                [place] ([facility]



             TUBERCULOSIS TEST, TUBERCULOSIS TEST, 12:00:00 AM               [facility]



             CELL MEDIATED CELL MEDIATED [facility]

)



             IMMUNITY AG RES IMMUNITY AG RES                           

 

             Past medical history Past medical history 07/15/2020               

 [place] ([facility]



             G2 L1 M0 A1   G2 L1 M0 A1 12:00:00 AM               [facility])

 

             Date of last Date of last 07/15/2020                [place] ([facility]



             menstruation menstruation 12:00:00 AM               [facility]



             2020    [facility])

 

             History of surgery History of surgery 07/15/2020                [place]

[place] ([facility]



             hernia repair ~bunion hernia repair 12:00:00 AM               [facility]

n [facility]



             removal bilateral ~bunion removal [facility])



             removal      bilateral removal                           

 

             Bmi documented BMI OUTSIDE NORMAL 2020                [facility] ([facility]



             outside normal RANGE - NO F/U PLAN 12:00:00 AM               [facility]



             parameters, [facility]

)



             follow-up plan                                        



             documented, no reason                                        



             given                                               

 

             IRON BINDING IRON BINDING 2020                [place] ([facility]



                                       12:00:00 AM               [facility])

 

             IRON         IRON         2020                [place] ([facility]



                                       12:00:00 AM               [facility])

 

             FOLIC ACID/B12 FOLIC ACID/B12 2020                [place] (M

ount



                                       12:00:00 AM               [facility])

 

             FERRITIN     FERRITIN     2020                [place] ([facility]



                                       12:00:00 AM               [facility])

 

             Date of last Date of last 2020                [place] ([facility]



             menstruation menstruation 12:00:00 AM               [facility]



             2020    [facility])

 

             No prior serious No prior serious 2020                [place]

Y ([facility]



             illness      illness      12:00:00 AM               [facility])

 

             Forms complete Forms complete 2020                [place] (M

ount



                                       12:00:00 AM               [facility])

 

             Bmi is documented BMI >  NORMAL 2020                [place] 

([facility]



             above normal DOCUMENTED W F/U 12:00:00 AM               [facility]



             parameters and a PLAN         [facility]

er)



             follow-up plan is                                        



             documented                                          

 

             STOOL OCCULT BLOOD STOOL OCCULT BLOOD 2020                [facility] ([place]



                                       12:00:00 AM               [facility])

 

             CBC-DIFF-PLATELET CBC-DIFF-PLATELET 2020                [facility] ([place]



                                       12:00:00 AM               [facility])

 

             Drug Screen -Optical Drug Screen -Optical 2020               

 [place] ([facility]



             observation - Any observation - Any 12:00:00 AM               [place]

n Neighborhood



             Number Class Number Class [facility])

 

             ORAL HYGIENE ORAL HYGIENE 2019                [place] ([facility]



             INSTRUCTION  INSTRUCTION  12:00:00 AM               [facility])

 

             ORAL HYGIENE ORAL HYGIENE 2019                [place] ([facility]



             INSTRUCTION  INSTRUCTION  12:00:00 AM               [facility])

 

             Periodic Oral Exam - Periodic Oral Exam - 2019               

 [place] ([facility]



             MKD WRAP     MKD WRAP     12:00:00 AM               [facility])

 

             PERIODIC ORAL EXAM PERIODIC ORAL EXAM 2019                [facility] ([place]



                                       12:00:00 AM               [facility])

 

             INTRAORAL PERIAPICAL INTRAORAL PERIAPICAL 2019               

 [place] ([facility]



             FIRST FLIM   FIRST FLIM   12:00:00 AM               [facility])

 

             BITEWINGS-FOUR FILMS BITEWINGS-FOUR FILMS 2019               

 [place] ([place]



                                       12:00:00 AM               [facility])

 

             INTRAORAL EACH INTRAORAL EACH 2019                [place] (M

ount



             ADDITIONAL   ADDITIONAL   12:00:00 AM               [facility])

 

             ADULT PROPHYLAXIS ADULT PROPHYLAXIS 2019                [place]

[facility] ([place]



                                       12:00:00 AM               [facility])

 

             ADULT PROPHYLAXIS ADULT PROPHYLAXIS 2019                [facility] ([place]



                                       12:00:00 AM               [facility])







Results







                    ID                  Date                Data Source

 

                    [id number] 2020 12:32:11 PM EDT [facility]

[place] ([place]



                    [id number]











          Name      Value     Range     Interpretation Description Data Source(s

) Supporting



                                        Code                          Document(s

)

 

          No Results No Results                     No Results [place] ([facility] 



                                                  Recorded For Rah    



                                                  Specified [facility]) 











                    ID                  Date                Data Source

 

                    [id number]             07/15/2020 01:42:00 PM EDT [place] ([facility])











          Name      Value     Range     Interpretation Description Data Source(s

) Supporting



                                        Code                          Document(s

)

 

          HIV 1+2   Non                           HIV Screen [place]  



          Ab+HIV1 p24 Reactive                      4th       ([place] 



          Ag [Presence]                               [place]ighborhood 



          in Serum or                               [facility]) 



          Plasma by                                                   



          Immunoassay                                                   











                    ID                  Date                Data Source

 

                    [id number]             07/15/2020 01:42:00 PM EDT [place] ([facility])











          Name      Value     Range     Interpretation Description Data Source(s

) Supporting



                                        Code                          Document(s

)

 

          Thyrotropin 2.100                         TSH       [place] ([facility] 



          [Units/volume] uIU/mL                                  [place]    



          in Serum or                                         [facility] 



          Plasma [facility]) 



          Detection                                                   



          limit <= 0.05                                                   



          mIU/L                                                       











                    ID                  Date                Data Source

 

                    [id number]             07/15/2020 01:42:00 PM EDT [place] ([facility])











          Name      Value     Range     Interpretation Description Data Source(s

) Supporting



                                        Code                          Document(s

)

 

          Hemoglobin 6.1 %               Above high normal Hemoglobin A1c [facility]

[place] ([facility] 



          A1c/Hemoglobi                                         [place]    



          n.total in                                         [facility]) 









                                        Note:          Prediabetes: 5.7 - 6.4   

      Diabetes: >6.4         Glycemic 

control



                                        for adults with diabetes: <7.0











                    ID                  Date                Data Source

 

                    [id number]             07/15/2020 01:42:00 PM EDT [place] ([facility])











          Name      Value     Range     Interpretation Description Data Source(s

) Supporting



                                        Code                          Document(s

)

 

          QuantiFERON 0.02                          QuantiFERON [place]  



          TB1 Ag Value IU/mL                         TB1 Ag Value ([facility]) 

 

          Service   See Note                      QuantiFERON [place]  



          comment                                 Criteria  ([facility]) 









                                        Note: The QuantiFERON-TB Gold Plus resul

t is determined by subtractingthe Nil 

value



                                        from either TB antigen (Ag) tube. The mi

togen tubeserves as a control for the 

test.









          QuantiFERON-TB Gold Negative                      QuantiFERON-TB Gold 

[place] ([facility] 



          Plus                                    Plus      [facility]) 

 

          QuantiFERON Incubation Incubation                     QuantiFERON GREE

[place] ([facility] 



                    performed.                     Incubation [facility]) 

 

          QuantiFERON TB2 Ag 0.01 IU/mL                     QuantiFERON TB2 Ag G

[place] ([facility] 



          Value                                   Value     [facility]) 

 

          QuantiFERON Mitogen 6.04 IU/mL                     QuantiFERON Mitogen

 [place] ([facility]) 

 

          Gamma interferon 0.01 IU/mL                     QuantiFERON Nil Value 

[place] ([facility] 



          background                                         [place]

 



          [Units/volume] in                                         [facility]) 



          Blood by Immunoassay                                                  

 











                    ID                  Date                Data Source

 

                    [id number]             07/15/2020 01:42:00 PM EDT [place] ([facility]

nt [facility])











          Name      Value     Range     Interpretation Description Data Source(s

) Supporting



                                        Code                          Document(s

)

 

          Neisseria Negative                      Neisseria [place]  



          gonorrhoeae                               gonorrhoeae, ([place] 



          rRNA [Presence]                               DAMIAN       [facility] 



          in [facility]) 



          specimen by                                                   



          Probe and                                                   



          target                                                      



          amplification                                                   



          method                                                      

 

          Chlamydia Negative                      Chlamydia [place]  



          trachomatis                               trachomatis, ([place] 



          rRNA [Presence]                               DAMIAN       [facility] 



          in [facility]) 



          specimen by                                                   



          Probe and                                                   



          target                                                      



          amplification                                                   



          method                                                      











                    ID                  Date                Data Source

 

                    [id number]             07/15/2020 01:42:00 PM EDT [place] ([facility])











          Name      Value     Range     Interpretation Description Data Source(s

) Supporting



                                        Code                          Document(s

)

 

          Triglyceride 259                 Above high Triglycerides [place]  



          [Mass/volume] mg/dL               normal              ([place] 



          in Serum or                                         [facility] 



          Plasma                                            [facility]) 

 

          Cholesterol 163                           Cholesterol, [place]  



          [Mass/volume] mg/dL                         Total     ([place] 



          in Serum or                                         [facility]) 

 

          Laboratory N/A                           Comment:  [place]  



          comment [Text]                                         ([place] 



          in Report                                         [facility]) 

 

          Cholesterol in 38                  Below low normal HDL Cholesterol GR

EENWAY  



          HDL       mg/dL                                   ([place] 



          [Mass/volume]                                         [facility] 



          in Serum or                                         [facility]) 



          Plasma                                                      

 

          Cholesterol in 73                            LDL Cholesterol [place] 

 



          LDL       mg/dL                         Calc      ([place] 



          [Mass/volume]                                         [facility] 



          in Serum or                                         [facility]) 



          Plasma by                                                   



          calculation                                                   

 

          Cholesterol in 1.9                           LDL/HDL Ratio [place]  



          LDL/Cholestero ratio                                   ([place] 



          l in HDL [Mass                                         Neighborhood 



          Ratio] in                                         [facility]) 



          Serum or                                                    



          Plasma                                                      









                                        Note:                                   

                 LDL/HDL Ratio



                                                                                

   Men  Women



                                                    1/2 Avg.Risk  1.0    1.5



                                        Avg.Risk  3.6    3.2                    

                          2X Avg.Risk  

6.2



                                        5.0                                     

         3X Avg.Risk  8.0    6.1









          Cholesterol in VLDL 52 mg/dL            Above high VLDL Cholesterol GR

EENWAY ([facility] 



          [Mass/volume] in                     normal    [facility] 



          Serum or Plasma by                                         [facility]

er) 



          calculation                                                   











                    ID                  Date                Data Source

 

                    [id number]             07/15/2020 01:42:00 PM EDT ISAIAH [facility]











          Name      Value     Range     Interpretation Description Data      Sup

porting



                                        Code                Source(s) Document(s

)

 

          Glucose   87                            Glucose   ISAIAH  



          [Mass/volume] in mg/dL                                   ([place]

 



          Serum or [facility])   

 

          Calcium   9.2                           Calcium   ISAIAH  



          [Mass/volume] in mg/dL                                   ([facility])   

 

          Protein   6.9                           Protein,  ISAIAH  



          [Mass/volume] in g/dL                          Total     ([facility])   

 

          Urea nitrogen 7 mg/dL                       BUN       ISAIAH  



          [Mass/volume] in                                         ([facility])   

 

          Albumin   4.1                           Albumin   ISAIAH  



          [Mass/volume] in g/dL                                    ([facility])   

 

          Bilirubin.total 0.4                           Bilirubin, ISAIAH  



          [Mass/volume] in mg/dL                         Total     ([place]

 



          Serum or [facility])   

 

          Alkaline  79 IU/L                       Alkaline  ISAIAH  



          phosphatase                               Phosphatase ([place] 



          [Enzymatic                                         Neighborhood 



          activity/volume]                                         Health    



          in Serum or Plasma                                         [place])   

 

          Aspartate 24 IU/L                       AST (SGOT) ISAIAH  



          aminotransferase                                         ([place]

 



          [Enzymatic                                         Neighborhood 



          activity/volume]                                         Health    



          in Serum or Plasma                                         [place])   

 

          Sodium    138                           Sodium    ISAIAH  



          [Moles/volume] in mmol/L                                  ([facility] 



          Serum or [facility])   

 

          Chloride  101                           Chloride  ISAIAH  



          [Moles/volume] in mmol/L                                  ([facility] 



          Serum or [facility])   

 

          Potassium 4.2                           Potassium ISAIAH  



          [Moles/volume] in mmol/L                                  ([facility])   

 

          Creatinine 0.48                Below low normal Creatinine ISAIAH  



          [Mass/volume] in mg/dL                                   ([facility])   

 

          Alanine   12 IU/L                       ALT (SGPT) ISAIAH  



          aminotransferase                                         ([place]

 



          [Enzymatic                                         Neighborhood 



          activity/volume]                                         Health    



          in Serum or Plasma                                         [place])   

 

          Urea      15                            BUN/Creatinin ISAIAH  



          nitrogen/Creatinin                               e Ratio   ([facility]

on 



          e [Mass Ratio] in                                         [facility]

 



          Serum or [facility])   

 

          Carbon dioxide, 23                            Carbon    [place]  



          total     mmol/L                        Dioxide,  ([place] 



          [Moles/volume] in                               Total     Neighborhood

 



          Serum or [facility])   

 

          Albumin/Globulin 1.5                           A/G Ratio ISAIAH  



          [Mass Ratio] in                                         ([place] 



          Serum or Plasma                                         [facility])   

 

          Globulin  2.8                           Globulin, ISAIAH  



          [Mass/volume] in g/dL                          Total     ([place]

 



          Serum by                                          [facility])   

 

          eGFR If NonAfricn 126                           eGFR If   ISAIAH  



          Am        mL/min/                       NonAfricn Am ([address])   

 

          eGFR If Africn Am 145                           eGFR If   ISAIAH  



                    mL/min/                       Africn Am ([address])   











                    ID                  Date                Data Source

 

                    [id number]             07/15/2020 01:42:00 PM EDT ISAIAH (Cynthia

nt [facility])











          Name      Value     Range     Interpretation Description Data Source(s

) Supporting



                                        Code                          Document(s

)

 

          Calcidiol 17.4                Below low normal Vitamin D, [place] ([place]

[place] 



          [Mass/volume ng/mL                         25-Hydroxy Rah    



          ] in Serum                                         [facility] 



          or [facility]) 









                                        Note: Vitamin D deficiency has been defi

anton[place] by the [place] of[employer] and an



                                        Endocrine Society practice guideline as 

alevel of serum 25-OH vitamin D less 

than 20



                                        ng/mL (1,2).The Endocrine Society went o

n to further define vitamin 

Dinsufficiency as



                                        a level between 21 and 29 ng/mL (2).1. I

OM ([place] of [employer]). 2010. 

Dietary



                                        reference   intakes for calcium and D. [place]

[place]: The   National Academies



                                        Press.2. Alcides MF, Jacob NC, Aracelis Rose LEE, et al.   Evaluation, 

treatment,



                                        and prevention of vitamin D   deficiency

: an Endocrine Society clinical practice



                                        guideline. JCEM. 2011; 96(7):1911-30

.











                    ID                  Date                Data Source

 

                    [id number]             07/15/2020 01:42:00 PM EDT [place] (Cynthia

nt [facility])











          Name      Value     Range     Interpretation Description Data      Sup

porting



                                        Code                Source(s) Document(s

)

 

          Benzodiazepine Negative                      Benzodiazepines ISAIAH 

 



          s [Presence] ng/mL                                   ([place] 



          in Urine                                          [facility])   

 

          Benzoylecgonin Negative                      Cocaine (Metab.) ISAIAH

  



          e [Presence] ng/mL                                   ([place] 



          in Urine                                          [facility])   

 

          Opiates   Negative                      Opiates   ISAIAH  



          [Presence] in ng/mL                                   ([facility])   









                                        Note: Opiate test includes Codeine and M

orphine only.









          Phencyclidine Negative ng/mL                     Phencyclidine [place]

Y ([place] 



          [Presence] in Urine                                         [facility]) 

 

          Barbiturates [Presence] Negative ng/mL                     Barbiturate

 ISAIAH ([place] 



          in Urine by Screen                                         [facility]) 

 

          Propoxyphene [Presence] Negative ng/mL                     Propoxyphen

e, Urine ISAIAH ([place] 



          in Urine                                          [facility]) 

 

          Cannabinoids [Presence] Negative ng/mL                     Cannabinoid

 ISAIAH ([place] 



          in Urine by Screen                                         [facility]) 

 

          Ethanol [Mass/volume] Negative %                     Ethanol, Urine GR

EENWAY ([place] 



          in Urine                                          [facility]) 

 

          Amphetamines [Presence] Negative ng/mL                     Amphetamine

s, Urine ISAIAH ([place] 



          in Urine by Screen                                         [facility]) 









                                        Note: Amphetamine test includes Amphetam

ine and Methamphetamine.









          Methadone [Presence] Negative ng/mL                     Methadone Scre

en, ISAIAH ([place] 



          in Urine by Screen                               Urine     [facility])   











                    ID                  Date                Data Source

 

                    [id number] 07/15/2020 12:27:13 PM EDT GREENWA

Y ([place]



                    [id number]                             [facility])











          Name      Value     Range     Interpretation Description Data Source(s

) Supporting



                                        Code                          Document(s

)

 

          No Results No Results                     No Results ISAIAH ([facility] 



                                                  Recorded For [facility]) 











                    ID                  Date                Data Source

 

                    [id number]           2020 12:00:00 AM EDT [facility]











          Name      Value     Range     Interpretation Code Description Data Rosita

rce(s) Supporting



                                                                      Document(s

)

 

          -nCoV                                         [facility]    



          RNA XXX                                                     



          DAMIAN+probe-                                                   



          Imp                                                         









                                        [facility] and reported by [employer]











                    ID                  Date                Data Source

 

                    [id number]           2020 12:00:00 AM EDT [facility]











          Name      Value     Range     Interpretation Code Description Data Rosita

rce(s) Supporting



                                                                      Document(s

)

 

          2019-nCoV                                         [facility]    



          RNA XXX                                                     



          DAMIAN+probe-                                                   



          Imp                                                         









                                        This lab was ordered by [facility]

JAMAR HENDRICKS and reported by [employer]











                    ID                  Date                Data Source

 

                    [id number]           2020 12:00:00 AM EDT [facility]











          Name      Value     Range     Interpretation Code Description Data Rosita

rce(s) Supporting



                                                                      Document(s

)

 

          2019-nCoV                                         [facility]    



          RNA XXX                                                     



          DAMIAN+probe-                                                   



          Imp                                                         









                                        This lab was ordered by [facility]

JAMAR HENDRICKS and reported by [employer]









                                        Procedure

 

                                        







Social History







           Code       Duration   Value      Status     Description Data Source(s

)

 

           Smoking    07/15/2020 Never smoked completed  Never smoked ISAIAH (

[place]



                      12:27:01 PM EDT tobacco               tobacco (finding) Ve

[facility]



                                 ([facility])                        [facility])

 

           Smoking    2020 Never smoked completed  Never smoked ISAIAH (

[place]



                      12:33:16 PM EST tobacco               tobacco (finding) Ve

[facility]



                                 ([facility])                        [facility])

 

           Smoking    2020 smoking status completed             ISAIAH (

[facility]



                      12:17:39 PM EST                                  [facility])







Vital Signs







                    ID                  Date                Data Source

 

                    UNK                                     











           Name       Value      Range      Interpretation Code Description Data

 Source(s)

 

           PhenX - pain, 0                                0          ISAIAH ([facility]



           abdominal - type                                             [facility]



           and intensity                                             Center)



           protocol                                               









                                        Pt presents today for labs results and e

mployment PE form









           Body surface area Derived from 2.66 m2                          2.66 

m2    ISAIAH ([facility])









                                        Pt presents today for labs results and e

mployment PE form









           Body mass index (BMI) 48.3 kg/m2                       48.3 kg/m2 GRE

ENWAY ([place]



           [Ratio]                                                [facility]

[facility]









                                        Pt presents today for labs results and e

mployment PE form









           Body weight 346 [lb_av]                       346 [lb_av] ISAIAH ([facility])









                                        Pt presents today for labs results and e

mployment PE form









           Body height 71 [in_us]                       71 [in_us] ISAIAH ([facility])









                                        Pt presents today for labs results and e

mployment PE form









           Body temperature 97.9 [degF]                       97.9 [degF] [facility] ([facility])









                                        Pt presents today for labs results and e

mployment PE form









           Heart rate 94 /min                          94 /min    ISAIAH ([facility])









                                        Pt presents today for labs results and e

mployment PE form









           Diastolic blood pressure 80 mm[Hg]                        80 mm[Hg]  

[place] ([facility])









                                        Pt presents today for labs results and e

mployment PE form









           Systolic blood pressure 128 mm[Hg]                       128 mm[Hg] G

[facility] ([facility])









                                        Pt presents today for labs results and e

mployment PE form









           PhenX - pain, abdominal - type and 0                                0

          [place] ([facility])









                                        Patient is here for a complete physical.









           Body surface area Derived from 2.66 m2                          2.66 

m2    [place] ([facility])









                                        Patient is here for a complete physical.









           Body mass index (BMI) 48.1 kg/m2                       48.1 kg/m2 [facility] ([place]



           [[facility]]                                                [facility])









                                        Patient is here for a complete physical.









           Body weight 345.0375 [lb_av]                       345.0375 [lb_av] MAITE

[facility] ([facility])









                                        Patient is here for a complete physical.









           Body height 71 [in_us]                       71 [in_us] [place] ([facility]

nt [facility])









                                        Patient is here for a complete physical.









           Body temperature 98.2 [degF]                       98.2 [degF] [facility]

AY ([facility])









                                        Patient is here for a complete physical.









           Heart rate rhythm 1                                1          [place]

Y ([facility])









                                        Patient is here for a complete physical.









           Heart rate 80 /min                          80 /min    [place] ([facility])









                                        Patient is here for a complete physical.









           Diastolic blood pressure 90 mm[Hg]                        90 mm[Hg]  

[place] ([facility])









                                        Patient is here for a complete physical.









           Systolic blood pressure 122 mm[Hg]                       122 mm[Hg] MAITE

[facility] ([facility])









                                        Patient is here for a complete physical.









           PhenX - pain, abdominal - type and 0                                0

          [place] ([facility])









                                        Follow up labs









           Body surface area Derived from 2.65 m2                          2.65 

m2    [place] ([facility])









                                        Follow up labs









           Body mass index (BMI) 47.7 kg/m2                       47.7 kg/m2 [facility] ([place]



           [facility]









                                        Follow up labs









           Body weight 342 [lb_av]                       342 [lb_av] ISAIAH ([facility])









                                        Follow up labs









           Body height 71 [in_us]                       71 [in_us] ISAIAH ([facility])









                                        Follow up labs









           Body temperature 98.1 [degF]                       98.1 [degF] GREENW

AY ([facility])









                                        Follow up labs









           Heart rate 97 /min                          97 /min    ISAIAH ([facility])









                                        Follow up labs









           Diastolic blood pressure 80 mm[Hg]                        80 mm[Hg]  

ISAIAH ([facility])









                                        Follow up labs









           Systolic blood pressure 116 mm[Hg]                       116 mm[Hg] G

REENWAY ([facility])









                                        Follow up labs









           PhenX - pain, abdominal - type and 0                                0

          ISAIAH ([facility])









                                        Pt needs follow up lab for work









           Body surface area Derived from 2.65 m2                          2.65 

m2    [place] ([facility])









                                        Pt needs follow up lab for work









           Body mass index (BMI) 47.7 kg/m2                       47.7 kg/m2 GRE

ENWAY ([place]



           [Ratio]                                                [facility])









                                        Pt needs follow up lab for work









           Body weight 342 [lb_av]                       342 [lb_av] ISAIAH ([facility])









                                        Pt needs follow up lab for work









           Body height 71 [in_us]                       71 [in_us] ISAIAH ([facility])









                                        Pt needs follow up lab for work









           Body temperature 98.7 [degF]                       98.7 [degF] GREENW

AY ([facility])









                                        Pt needs follow up lab for work









           Heart rate 95 /min                          95 /min    [place] ([facility])









                                        Pt needs follow up lab for work









           Diastolic blood pressure 80 mm[Hg]                        80 mm[Hg]  

ISAIAH ([facility])









                                        Pt needs follow up lab for work









           Systolic blood pressure 130 mm[Hg]                       130 mm[Hg] G

REENWAY ([facility])









                                        Pt needs follow up lab for work









           PhenX - pain, abdominal - type and 0                                0

          ISAIAH ([facility])









                                        Pt. presenting for pre-employment form c

ompletion.









           Body surface area Derived from 2.63 m2                          2.63 

m2    ISAIAH ([facility])









                                        Pt. presenting for pre-employment form c

ompletion.









           Body mass index (BMI) 46.9 kg/m2                       46.9 kg/m2 GRE

ENWAY ([place]



           [Ratio]                                                [facility])









                                        Pt. presenting for pre-employment form c

ompletion.









           Body weight 336 [lb_av]                       336 [lb_av] ISAIAH (

[facility])









                                        Pt. presenting for pre-employment form c

ompletion.









           Body height 71 [in_us]                       71 [in_us] ISAIAH ([facility])









                                        Pt. presenting for pre-employment form c

ompletion.









           Body temperature 97.9 [degF]                       97.9 [degF] [facility] ([facility])









                                        Pt. presenting for pre-employment form c

ompletion.









           Heart rate 93 /min                          93 /min    ISAIAH ([facility])









                                        Pt. presenting for pre-employment form c

ompletion.









           Diastolic blood pressure 83 mm[Hg]                        83 mm[Hg]  

ISAIAH ([facility])









                                        Pt. presenting for pre-employment form c

ompletion.









           Systolic blood pressure 131 mm[Hg]                       131 mm[Hg] G

[facility]









                                        Pt. presenting for pre-employment form c

ompletion.







Patient Treatment Plan of Care







             Planned Activity Planned Date Details      Description  Data Source

(s)

 

             Vitamin D    2020 12:00:00                           ISAIAH

 ([place]



             (Ergocalciferol) 1.25 AM [facility]



             MG(340[address] [place]) Oral                                        Center)



             Capsule                                             

 

             Tricor 48MG Oral 2020 12:00:00                           KAREY

NWAY ([place]



             Tablet       AM [facility])

 

             Vitamin D    2019 12:00:00                           ISAIAH

 ([place]



             (Ergocalciferol) AM [facility]



             [id number] Oral                                        Center)



             Capsule                                             

 

             Ascorbic Acid 500 MG 2019 12:00:00                           

[place] ([place]



             Oral Tablet  [facility])

 

             ferrous sulfate 325 2019 12:00:00                           G

[facility]



             MG Oral Tablet [facility])

 

             Tricor 48MG Oral 2018 12:00:00                           KAREY RENEE ([facility]



             Tablet       AM [facility])